# Patient Record
Sex: MALE | Race: WHITE
[De-identification: names, ages, dates, MRNs, and addresses within clinical notes are randomized per-mention and may not be internally consistent; named-entity substitution may affect disease eponyms.]

---

## 2023-07-26 ENCOUNTER — HOSPITAL ENCOUNTER (INPATIENT)
Dept: HOSPITAL 46 - ED | Age: 68
LOS: 2 days | Discharge: HOME | DRG: 176 | End: 2023-07-28
Attending: STUDENT IN AN ORGANIZED HEALTH CARE EDUCATION/TRAINING PROGRAM | Admitting: INTERNAL MEDICINE
Payer: MEDICARE

## 2023-07-26 VITALS — DIASTOLIC BLOOD PRESSURE: 82 MMHG | SYSTOLIC BLOOD PRESSURE: 109 MMHG

## 2023-07-26 VITALS — WEIGHT: 131.4 LBS | BODY MASS INDEX: 21.89 KG/M2 | HEIGHT: 65 IN

## 2023-07-26 VITALS — DIASTOLIC BLOOD PRESSURE: 79 MMHG | SYSTOLIC BLOOD PRESSURE: 118 MMHG

## 2023-07-26 DIAGNOSIS — K22.4: ICD-10-CM

## 2023-07-26 DIAGNOSIS — Z91.018: ICD-10-CM

## 2023-07-26 DIAGNOSIS — Z79.01: ICD-10-CM
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
## 2023-07-26 DIAGNOSIS — I26.99: Primary | ICD-10-CM

## 2023-07-26 DIAGNOSIS — Z20.822: ICD-10-CM

## 2023-07-26 DIAGNOSIS — Z91.010: ICD-10-CM

## 2023-07-26 DIAGNOSIS — R63.4: ICD-10-CM

## 2023-07-26 DIAGNOSIS — I82.412: ICD-10-CM

## 2023-07-26 DIAGNOSIS — K44.9: ICD-10-CM

## 2023-07-26 DIAGNOSIS — Z79.899: ICD-10-CM

## 2023-07-26 DIAGNOSIS — M54.6: ICD-10-CM

## 2023-07-26 DIAGNOSIS — R13.10: ICD-10-CM

## 2023-07-26 DIAGNOSIS — I82.4Z2: ICD-10-CM

## 2023-07-26 PROCEDURE — A9270 NON-COVERED ITEM OR SERVICE: HCPCS

## 2023-07-26 PROCEDURE — U0002 COVID-19 LAB TEST NON-CDC: HCPCS

## 2023-07-26 NOTE — NUR
PT ARRIVED AT THIS TIME, HE AMBULATED TO BATHROOM HAD UNMEASURED VOID AND BM,
NOTED HEART RATE UP /MIN AS PATIENT BACK TO BED, NOT SUSTAINED.

## 2023-07-26 NOTE — NUR
US TECH IN ROOM NOW PER ORDERS, PT RESTING IN BED, 1MG IV MORPHINE
ADMINISTERED FOR PAIN 6/10 AT HIS BACK/LEG.HEPARIN DRIP IS RUNNING NOW

## 2023-07-27 VITALS — SYSTOLIC BLOOD PRESSURE: 97 MMHG | DIASTOLIC BLOOD PRESSURE: 79 MMHG

## 2023-07-27 VITALS — SYSTOLIC BLOOD PRESSURE: 108 MMHG | DIASTOLIC BLOOD PRESSURE: 81 MMHG

## 2023-07-27 VITALS — DIASTOLIC BLOOD PRESSURE: 78 MMHG | SYSTOLIC BLOOD PRESSURE: 105 MMHG

## 2023-07-27 VITALS — SYSTOLIC BLOOD PRESSURE: 105 MMHG | DIASTOLIC BLOOD PRESSURE: 85 MMHG

## 2023-07-27 VITALS — DIASTOLIC BLOOD PRESSURE: 75 MMHG | SYSTOLIC BLOOD PRESSURE: 106 MMHG

## 2023-07-27 VITALS — DIASTOLIC BLOOD PRESSURE: 75 MMHG | SYSTOLIC BLOOD PRESSURE: 98 MMHG

## 2023-07-27 VITALS — DIASTOLIC BLOOD PRESSURE: 91 MMHG | SYSTOLIC BLOOD PRESSURE: 123 MMHG

## 2023-07-27 VITALS — DIASTOLIC BLOOD PRESSURE: 76 MMHG | SYSTOLIC BLOOD PRESSURE: 98 MMHG

## 2023-07-27 VITALS — SYSTOLIC BLOOD PRESSURE: 107 MMHG | DIASTOLIC BLOOD PRESSURE: 80 MMHG

## 2023-07-27 VITALS — SYSTOLIC BLOOD PRESSURE: 103 MMHG | DIASTOLIC BLOOD PRESSURE: 81 MMHG

## 2023-07-27 VITALS — DIASTOLIC BLOOD PRESSURE: 79 MMHG | SYSTOLIC BLOOD PRESSURE: 101 MMHG

## 2023-07-27 VITALS — DIASTOLIC BLOOD PRESSURE: 73 MMHG | SYSTOLIC BLOOD PRESSURE: 94 MMHG

## 2023-07-27 VITALS — SYSTOLIC BLOOD PRESSURE: 115 MMHG | DIASTOLIC BLOOD PRESSURE: 76 MMHG

## 2023-07-27 VITALS — SYSTOLIC BLOOD PRESSURE: 93 MMHG | DIASTOLIC BLOOD PRESSURE: 77 MMHG

## 2023-07-27 NOTE — NUR
PATIENT TO SIDE OF BED TO USE URINAL, -120 WITH THIS ACTIVITY. WASHCLOTH
USED ON UPPER BODY, FACE AND HANDS. NEW GOWN PROVIDED. PATIENT LYING BACK IN
BED AT THIS TIME. CALL LIGHT AND PERSONAL PHONE IN EASY REACH

## 2023-07-27 NOTE — NUR
PT ALERT TO RN AT BEDSIDE FOR AM ASSESSMENT, PT REPORTS PAIN IS TOLERABLE BUT
IS STARTING TO INCREASE, WILL PROVIDE BRENNON COVERAGE BEFORE SHIFT CHANGE. NO
OTHER REQUESTS OR CONCERNS.

## 2023-07-27 NOTE — NUR
PT AWAKE, HE REPORTS HE IS NOT SURE OF A PAIN RATING YET AS HE JUST WOKE, HE
SAID HE NEEDED TO USE THE BATHROOM, BEDSIDE COMMODE PROVIDED TO LIMIT
ACTIVITY.

## 2023-07-27 NOTE — NUR
PATIENT SITTING UP IN BED EATING DINNER. THIS RN SPOKE WITH MD PERRIN ABOUT
ORAL PAIN MEDICATION FOR PATIENT. SEE NEW ORDERS. NO OTHER NEEDS AT THIS TIME.

## 2023-07-27 NOTE — NUR
PATIENT REPORT RECIEVED FROM NIGHT SHIFT RN. PATIENT RESTING IN BED. PATIENT
ON HEPARIN GTT AT  19UNITS/KG/HR PER HEPARIN GTT ORDERS. PATIENT CALLS
APPROPRIATELY. WILL CLOSELY MONTIOR.

## 2023-07-27 NOTE — NUR
PATIENT ASSESSMENT COMPLETED AND REMAISN UNCHNGED. PATIENT HAS LABS DUE TO
1630. PATIENT SITTING UP IN BED AT THIS TIME. PATIENT DENIES ANY NEEDS AT THIS
TIME.

## 2023-07-27 NOTE — NUR
PRN MORPHINE GIVEN FOR 5/10 PAIN IN HIS CHEST. PATIENT REPORTS PAIN IS WORSE
WITH DEEP INSPIRATION AND COUGHING OR MOVEMENT. PATIENT ASSESSMENT COMPELTED
AT THSI TIME AND REMAINS UNCHANGED FROM PRIOR IN THE SHIFT. LUNCH PROVIDED.
PATIENT HAS DONE WELL SWALLOWING TODAY WITH NO ISSUES. PATIENTS PTT 61.2. PER
HEPARIN ORDERS NO CHANGES TO HEPARIN GTT AND NO BOLUS REQUIRED WILL RECHECK
PTT IN 6 HOURS. CALL LIGHT IN REACH.

## 2023-07-27 NOTE — NUR
PATIENT REPORTING PAIN 4/10 ACROSS HIS CHEST, BACK, AND LEG. PRN ORAL
MEDICATION ADMINISTERED. PATIENT SITTING UP IN BED EATING DINNER. PATIENT
DENIES WANTING A PROTIEN DRINK. PATIENT DENIES ANY OTHER NEEDS AT THIS TIME.

## 2023-07-27 NOTE — NUR
PATIENT RESTING IN BED. REPORTS BEING TIRED BUT COMFORTABLE. BETTER PAIN
CONTROL WITH PO PAIN MEDS. VS STABLE. IV SITE WNL X2; HEPARIN INFUSING PER
ORDER. PATIENT DENIES GI UPSET. IS ON BED REST AND USING URNAL AT BEDSIDE.
PATIENT DENIES FEELING SOB. LUNG SOUNDS ARE DIM THORUGHOUT. TOLERATING ROOM
AIR. LEFT LOWER EXTREMITIY IS COOL TO TOUCH; SLIGHTLY MORE THAN THE RIGHT. CAP
REFILL IS <3 SECONDS. UNABLE TO PALPATE PEDAL PULSE. PATIENT HAS FEELING
UNCHANGED IN THE LEFT VS. RIGHT LEG.

## 2023-07-27 NOTE — NUR
PT IN BED.  STATES NOT FEELING GREAT BY ADMITS FEELS BETTER THAN WHEN HE CAME
IN.  CONSENTED TO PRAYER.  PRAYED FOR HEALING, COMFORT AND PEACE.

## 2023-07-27 NOTE — NUR
PT JUST SAT AT BEDSIDE TO VOID IN URINAL 250ML. PT THEN BACK TO BED, FRESH ICE
WATER PROVIDED, WARM BLANKET PROVIDED, PT REPORTS PAIN 6/10 GENERALIZED,
BACK/LEG. 2MG IV MORPHINE PRN ADMINISTERED. PT HAS NO OTHER REQUESTS AT THIS
TIME.

## 2023-07-27 NOTE — NUR
PATIENTS ASSESSMENT COMPELTED. PATIENT RESTING IN BED. PATIENT REPORTS PAIN
THROUGHT HIS CHEST WORSE WITH MOVEMENT, COUGHING, OR DEEP BREATHING. PATIENT
DENIES NEEDING ANYTHING FOR PAIN AT THIS TIME. PATIENT REPORTS SOME DISCOMFORT
IN HER LEFT LEG. PULSES ARE INTACT AND BILATERAL FEET ARE PINK AND WARM. MD
SPOKE WITH DR LOPEZ FROM [facility] ABOUT PATIENTS CONDITION AND PLAN OF CARE. PER MD NELDA LOPEZ SAID TO CONTINUE TREATMENT WE ARE ALREADY DOING AND NO OTHER
RECOMMENDATIONS AT THIS TIME. MD BELL UPDATED PATIENT ON PLAN OF CARE. PATIENT
REMAINS ON HEPARIN GTT. ALL QUESTIONS ANSERED. CALL LIGHT IN REACH. PATIENT
RESTING IN BED AT THIS TIME. WILL ENCOURAGE REST TODAY AND PER MD WILL
POSSIBLY START PT/OT TOMORROW.

## 2023-07-27 NOTE — NUR
Spoke with pt and he states he owns his house and lives alone. He has 2 steps
into the home and no issues getting in or out.  He states he is retired and
worked 28 years as the  at [employer]. 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 He states
the last 4 years he stopped doing everything but caregiving. She now lives in
[place]. He was with her, but moved back approximately a year ago. He states his
health has declined and he has frequent falls. He is unsure if he would like
PT. He is also unsure why he is falling.  He has lost 30 pounds over the last
two years. He does all his own shopping, cooking, cleaning, and driving. He
plans on dc to home. He denies needs.  He will consider PT OP for his falls.
He has a cane at home. Denies further needs. Denies financial issues.

## 2023-07-27 NOTE — NUR
PATIENT STOOD AT BEDSIDE TO USE URNAL; HR INCREASED 'S WITH ACTIVITY.
PATIENT APPEARED SLIGHTLY SOB BUT DENIED IT. ASSISTED BACK INTO BED AND
POSITIONED FOR COMFORT.

## 2023-07-28 VITALS — DIASTOLIC BLOOD PRESSURE: 98 MMHG | SYSTOLIC BLOOD PRESSURE: 109 MMHG

## 2023-07-28 VITALS — SYSTOLIC BLOOD PRESSURE: 126 MMHG | DIASTOLIC BLOOD PRESSURE: 86 MMHG

## 2023-07-28 VITALS — DIASTOLIC BLOOD PRESSURE: 76 MMHG | SYSTOLIC BLOOD PRESSURE: 97 MMHG

## 2023-07-28 VITALS — SYSTOLIC BLOOD PRESSURE: 113 MMHG | DIASTOLIC BLOOD PRESSURE: 84 MMHG

## 2023-07-28 VITALS — SYSTOLIC BLOOD PRESSURE: 103 MMHG | DIASTOLIC BLOOD PRESSURE: 79 MMHG

## 2023-07-28 VITALS — SYSTOLIC BLOOD PRESSURE: 90 MMHG | DIASTOLIC BLOOD PRESSURE: 69 MMHG

## 2023-07-28 VITALS — SYSTOLIC BLOOD PRESSURE: 97 MMHG | DIASTOLIC BLOOD PRESSURE: 72 MMHG

## 2023-07-28 VITALS — SYSTOLIC BLOOD PRESSURE: 95 MMHG | DIASTOLIC BLOOD PRESSURE: 84 MMHG

## 2023-07-28 VITALS — SYSTOLIC BLOOD PRESSURE: 120 MMHG | DIASTOLIC BLOOD PRESSURE: 92 MMHG

## 2023-07-28 VITALS — DIASTOLIC BLOOD PRESSURE: 79 MMHG | SYSTOLIC BLOOD PRESSURE: 93 MMHG

## 2023-07-28 VITALS — DIASTOLIC BLOOD PRESSURE: 83 MMHG | SYSTOLIC BLOOD PRESSURE: 97 MMHG

## 2023-07-28 NOTE — NUR
Assessment complete - Pt reports improved pain with inspiration. Left base
more dim than right, remains on room air. Sinus Tach on monitor, resting
rates in 100's, will continue to monitor. Pedal
pulses equal and strong, no
redness and minimal swelling noted in left lower leg. Pt reports pain
currently 3/10. IV site patent x2, heparin drip remains at 20u/kg/hr, infusing
into R wrist IV. Pt able to eat 25% of breakfast with additional ensure shake
complete. Updated on todays POC, denies questions or concerns. Call light in
reach.

## 2023-07-28 NOTE — NUR
PATIENT UP TO USE URNAL. PATIENT'S HR UP TO 'S WITH ACTIVITY BUT
PATIENT RECOVERED QUICKLY. PATIENT DENIED ANY NEEDS.

## 2023-07-28 NOTE — NUR
SPOKE TO PATIENT ABOUT HIS DISCHARGE PLAN. PATIENT PLANS TO GO HOME TO HIS
HOUSE THAT HE OWNS AND LIVES BY HIMSELF. NO CHANGE IN THE DISCHARGE PLAN.

## 2023-07-28 NOTE — NUR
PATIENT REPORTS PAIN 7/10; PRN MORPHINE AND OXY PROVIDED. PATIENT'S IV SITE IN
RIGHT FOREARM IS PAINFUL TO FLUSH. NEW IV SITE ESTABLISHED AND LABS DRAWN.
PATIENT VOIDED TO BEDSIDE. ASSESSMENT UNCHANGED. CALL LIGHT IN REACH.

## 2023-07-28 NOTE — NUR
DISCHARGE VITALS CHARTED. PATIENT DRESSED IN PERSONAL CLOTHING, PATIENT
STRUGGLED TO PUT HIS OWN SHIRT ON, BUT WAS ABLE TO DO IT HIMSELF. PATIENTS
SISTER ON HER WAY TO PICK HIM UP.

## 2023-07-28 NOTE — NUR
PT LOOKING FORWARD TO DISCHARGE.  INDICATED WILLINGNESS TO STAY IF MEDICALLY
NECESSARY.  CONSENTED TO PRAYER.  PRAYED FOR ONGOING HEALING.

## 2023-07-28 NOTE — NUR
Pt wakes easily for breakfast - sitting up in bed posistioning self with
grimace in pain. States pain is tolerable after prn administered early in
morning. "I hurt in places there arnt names for".
Nutritional drink provided.
No respiratory distress noted, remains on room air, vital signs wnl.

## 2023-07-28 NOTE — NUR
PATIENT RESTING IN BED. FOCUSED ASSESSMENT OF LEFT LOWER EXTREMITY AND
RESPIRTORY STATUS IS UNCHANGED. VS STABLE. ALLOWED PATIENT TO REST.
